# Patient Record
Sex: MALE | Race: WHITE | Employment: OTHER | URBAN - METROPOLITAN AREA
[De-identification: names, ages, dates, MRNs, and addresses within clinical notes are randomized per-mention and may not be internally consistent; named-entity substitution may affect disease eponyms.]

---

## 2019-07-18 ENCOUNTER — OFFICE VISIT (OUTPATIENT)
Dept: ORTHOPEDIC SURGERY | Age: 74
End: 2019-07-18

## 2019-07-18 VITALS — WEIGHT: 168 LBS | HEIGHT: 69 IN | BODY MASS INDEX: 24.88 KG/M2

## 2019-07-18 DIAGNOSIS — M77.11 RIGHT TENNIS ELBOW: Primary | ICD-10-CM

## 2019-07-18 DIAGNOSIS — M25.521 ELBOW PAIN, RIGHT: ICD-10-CM

## 2019-07-18 PROCEDURE — 1123F ACP DISCUSS/DSCN MKR DOCD: CPT | Performed by: ORTHOPAEDIC SURGERY

## 2019-07-18 PROCEDURE — 3017F COLORECTAL CA SCREEN DOC REV: CPT | Performed by: ORTHOPAEDIC SURGERY

## 2019-07-18 PROCEDURE — 1036F TOBACCO NON-USER: CPT | Performed by: ORTHOPAEDIC SURGERY

## 2019-07-18 PROCEDURE — 99203 OFFICE O/P NEW LOW 30 MIN: CPT | Performed by: ORTHOPAEDIC SURGERY

## 2019-07-18 PROCEDURE — 20605 DRAIN/INJ JOINT/BURSA W/O US: CPT | Performed by: ORTHOPAEDIC SURGERY

## 2019-07-18 PROCEDURE — 4040F PNEUMOC VAC/ADMIN/RCVD: CPT | Performed by: ORTHOPAEDIC SURGERY

## 2019-07-18 PROCEDURE — G8420 CALC BMI NORM PARAMETERS: HCPCS | Performed by: ORTHOPAEDIC SURGERY

## 2019-07-18 PROCEDURE — G8427 DOCREV CUR MEDS BY ELIG CLIN: HCPCS | Performed by: ORTHOPAEDIC SURGERY

## 2019-07-18 NOTE — PROGRESS NOTES
Date:  2019    Name:  Gallo Pittman  Address:  [address] [place] [address]  [place] [address]    :  1945      Age:   68 y.o.    SSN:        Medical Record Number:  [id number]    Reason for Visit:    Chief Complaint    Elbow Pain (OP/SP - Right elbow pain)      DOS:2019     HPI: Gallo Pittman is a pleasant 68 y.o. male here today for evalutaion of his right elbow. We previously saw him in 2016 for lateral epicondylitis of the right elbow. This was resolved with a conservative treatment plan of physical therapy, an anti-inflammatory (Mobic) and a corticosteroid injection. He recently began experiencing symptoms again a few weeks ago with no known injury associated. This pain has been gradually getting worse with activities, especially throwing a ball with his grandson. He continues to wear the counterforce elbow brace daily since we have treated him in 2016. Additionally he is seeking Dr Los Cid for arthritis of his right thumb. He presents today with a thumb splint over the thumb. He reports no new or active neurological symptoms . Pain Assessment  Location of Pain: Elbow  Location Modifiers: Right  Severity of Pain: 8  Quality of Pain: Sharp, Aching  Duration of Pain: Persistent  Frequency of Pain: Intermittent  Aggravating Factors: (Gripping exercises)  Limiting Behavior: Yes  Relieving Factors: Rest  Result of Injury: No  Work-Related Injury: No  Are there other pain locations you wish to document?: No  ROS: All systems reviewed and otherwise negative. Pertinent items are noted in HPI.         Past Medical History:   Diagnosis Date    Arthritis     H/O blood clots     Neuropathy     Parkinson disease (HCC)     Seizure ([facility] [address]     Thyroid disease         Past Surgical History:   Procedure Laterality Date    APPENDECTOMY      NOSE SURGERY      WRIST SURGERY         Family History   Problem Relation Age of Onset    Parkinsonism Sister     Diabetes Sister     Heart Disease Brother Social History     Socioeconomic History    Marital status:      Spouse name: None    Number of children: None    Years of education: None    Highest education level: None   Occupational History    None   Social Needs    Financial resource strain: None    Food insecurity:     Worry: None     Inability: None    Transportation needs:     Medical: None     Non-medical: None   Tobacco Use    Smoking status: Never Smoker    Smokeless tobacco: Never Used   Substance and Sexual Activity    Alcohol use:  Yes     Alcohol/week: 0.0 standard drinks    Drug use: None    Sexual activity: None   Lifestyle    Physical activity:     Days per week: None     Minutes per session: None    Stress: None   Relationships    Social connections:     Talks on phone: None     Gets together: None     Attends Je[employer] witness service: None     Active member of club or organization: None     Attends meetings of clubs or organizations: None     Relationship status: None    Intimate partner violence:     Fear of current or ex partner: None     Emotionally abused: None     Physically abused: None     Forced sexual activity: None   Other Topics Concern    None   Social History Narrative    None       Current Outpatient Medications   Medication Sig Dispense Refill    rivaroxaban (XARELTO) 20 MG TABS tablet TAKE 1 TABLET EVERY DAY      rasagiline mesylate (AZILECT) 1 MG TABS Take 1 tablet by mouth daily      BACLOFEN PO Take by mouth      LEVOTHYROXINE SODIUM PO Take by mouth      Mometasone Furoate (NASONEX NA) by Nasal route      Esomeprazole Magnesium (NEXIUM PO) Take by mouth      Misc Natural Products (OSTEO BI-FLEX JOINT SHIELD PO) Take by mouth      phenytoin (DILANTIN) 100 MG ER capsule Take by mouth 2 times daily      meloxicam (MOBIC) 15 MG tablet Take 1 tablet by mouth daily 1 po qd prn pain (Patient not taking: Reported on 7/18/2019) 30 tablet 2    Multiple Vitamins-Minerals (MULTIVITAMIN PO) Take by mouth

## 2019-07-25 ENCOUNTER — EVALUATION (OUTPATIENT)
Dept: PHYSICAL THERAPY | Age: 74
End: 2019-07-25

## 2019-07-25 DIAGNOSIS — M77.11 RIGHT LATERAL EPICONDYLITIS: ICD-10-CM

## 2019-07-25 DIAGNOSIS — M25.521 RIGHT ELBOW PAIN: Primary | ICD-10-CM

## 2019-07-25 DIAGNOSIS — M77.11 LATERAL EPICONDYLITIS OF RIGHT ELBOW: Primary | ICD-10-CM

## 2019-07-25 PROCEDURE — G8539 DOC FUNCT AND CARE PLAN: HCPCS | Performed by: PHYSICAL THERAPIST

## 2019-07-25 PROCEDURE — 1101F PT FALLS ASSESS-DOCD LE1/YR: CPT | Performed by: PHYSICAL THERAPIST

## 2019-07-25 PROCEDURE — 97140 MANUAL THERAPY 1/> REGIONS: CPT | Performed by: PHYSICAL THERAPIST

## 2019-07-25 PROCEDURE — 97110 THERAPEUTIC EXERCISES: CPT | Performed by: PHYSICAL THERAPIST

## 2019-07-25 PROCEDURE — G8730 PAIN DOC POS AND PLAN: HCPCS | Performed by: PHYSICAL THERAPIST

## 2019-07-25 PROCEDURE — G8427 DOCREV CUR MEDS BY ELIG CLIN: HCPCS | Performed by: PHYSICAL THERAPIST

## 2019-07-25 PROCEDURE — L3908 WHO COCK-UP NONMOLDE PRE OTS: HCPCS | Performed by: ORTHOPAEDIC SURGERY

## 2019-07-25 PROCEDURE — 97161 PT EVAL LOW COMPLEX 20 MIN: CPT | Performed by: PHYSICAL THERAPIST

## 2019-07-25 NOTE — PLAN OF CARE
[]profession/work barriers  [x]past PT/medical experience- prior skilled therapy experience   []other:  Justification: See above      Falls Risk Assessment (30 days):   [x] Falls Risk assessed and no intervention required. [] Falls Risk assessed and Patient requires intervention due to being higher risk   TUG score (>12s at risk):     [] Falls education provided, including           Patient BMI:24.81 kg/m²    Pain  [x]  Pain diagram was completed, pain was present and a follow up plan to address the pain is in the plan of care. ()   []  Pain diagram was completed and there was no pain present and therefore no follow up plan to address pain in the plan of care. ()   []  Pain diagram was not completed and the reason for not completing the pain diagram is in the medical chart ()  []  Patient refused to participate   []  Severe mental or physical capacity, patient is in urgent emergent situation and to complete the questionnaire would jeopardize the patient's health status        Functional Questionnaire  [x]  Patient completed the functional outcome questionnaire and deficiencies were addressed in the plan of care. ()   []  Patient completed the outcome questionnaire and there were no functional deficits identified and therefore no plan of care is required. ()   []  Patient did not complete the functional outcome questionnaire and the reason why is documented in the medical chart. ()  []  Patient refused to participate   []  Patient unable to complete the questionnaire   []   A functional outcome assessment has been reported on within the last 30 days        Falls  [x]  The patient has had no falls or 1 fall without injury in the past year. (1101F)   []  There is no documentation of fall in the medical chart (1101F,8P)     [] The patient has had 2 or more falls or one fall with injury in the past year that is documented in the medical chart.  (1100F)  []  A falls risk assessment was completed and documented in the medical chart. (0272M)   []  Falls were addressed in the plan of care. (4407H)   []  A falls risk assessment was not completed and documented in the medical chart (6462W,3I)   []   Falls were not addressed in the plan of care and reason was documented in the plan of care. (3217I 1P)        Medication     [x] A list of current medications (including prescription, over the counter, herbal supplements, vitamin supplements, mineral supplements, dietary supplements) was reviewed with the patient and documented in the medical chart. ()    ASSESSMENT:   Functional Impairments   [x]Noted spinal or UE joint hypomobility   []Noted spinal or UE joint hypermobility   [x]Decreased UE functional ROM   []Decreased UE functional strength   [x]Abnormal reflexes/sensation/myotomal/dermatomal deficits   []Decreased RC/scapular/core strength and neuromuscular control   []other:      Functional Activity Limitations (from functional questionnaire and intake)   [x]Reduced ability to tolerate prolonged functional positions   []Reduced ability or difficulty with changes of positions or transfers between positions   []Reduced ability to maintain good posture and demonstrate good body mechanics with sitting, bending, and lifting   [x] Reduced ability or tolerance with driving and/or computer work   []Reduced ability to sleep   [x]Reduced ability to perform lifting, reaching, carrying tasks   []Reduced ability to tolerate impact through UE   []Reduced ability to reach behind back   [x]Reduced ability to  or hold objects   []Reduced ability to throw or toss an object   []other:    Participation Restrictions   [x]Reduced participation in self care activities   [x]Reduced participation in home management activities   []Reduced participation in work activities   [x]Reduced participation in social activities. []Reduced participation in sport/recreation activities.     Classification:   []Signs/symptoms hour without any pain in R elbow so that he may return to PLOF.          Electronically signed by:  Saurabh Montilla, PT, DPT, OMT-C      [facility] PT license: [id number]  [place] PT license: [id number]

## 2019-07-25 NOTE — FLOWSHEET NOTE
[facility]   Phone: [phone]    Fax: [phone]        Physical Therapy Daily Treatment Note  Date:  2019    Patient Name:  Destinee Adames    :  1945  MRN: [id number]  Medical/Treatment Diagnosis Information:  · Diagnosis: R tennis elbow  ·    Insurance/Certification information:  PT Insurance Information: [facility] [employer] Medicare  Physician Information:  Referring Practitioner: Dr. Art Knight of care signed (Y/N): sent 19    Date of Patient follow up with Physician: 19    Functional Outcome Measure:      Date Applied:  19   UEFI:  58/80  27.5% limitation     PQRS:   Reviewed medication list with patient. No changes since last PT visit.   19    Progress Note: [x]  Yes  []  No  Next due by: Visit #10 or 19    Latex Allergy:  [x]NO      []YES  Preferred Language for Healthcare:   [x]English       []other:    Visit # Insurance Allowable   1 Med nec      Pain level:  0/10 at rest after steroid injection   Prior to steroid injection up to 7/10     SUBJECTIVE:  See eval    OBJECTIVE: See eval  ROM PROM AROM Comments: 19     Left Right Left Right     Elbow flexion             Elbow extension             Pronation             Supination             Wrist flexion     50° 40°     Wrist extension     60° 48°     Wrist radial deviation     28° 28°     Wrist ulnar deviation     15° 25°        Special Tests Left Right Comments: 19   Cozens   Negative        Strength Left Right Comments:   19   Elbow flexion 5/5 5/5     Elbow extension         Pronation         Supination         Wrist flexion 5/5 5/5     Wrist extension 5/5 5/5     Wrist radial deviation 5/5 5/5     Wrist ulnar deviation 5/5 5/5         Left Right Comments: 19   Level 1         Level 2 67.3 lbs 57.7 lbs     Level 3 60 lbs 59 lbs            RESTRICTIONS/PRECAUTIONS: hx of R wrist fracture and surgery, intermittent numbness in all 5 fingers of R hand, hx of R tennis proper functional mobility as indicated by patients Functional Deficits. 4. Patient will return to all functional activities without increased symptoms or restriction. 5. Patient will be able to drive greater than one hour without any pain in R elbow so that he may return to PLOF. Progression Towards Functional goals:  [] Patient is progressing as expected towards functional goals listed. [] Progression is slowed due to complexities listed. [] Progression has been slowed due to co-morbidities.   [x] Plan just implemented, too soon to assess goals progression  [] Other:     ASSESSMENT:  See eval    Treatment/Activity Tolerance:  [x] Patient tolerated treatment well [] Patient limited by fatique  [] Patient limited by pain  [] Patient limited by other medical complications  [] Other:     Prognosis: [x] Good [] Fair  [] Poor    Patient Requires Follow-up: [x] Yes  [] No    PLAN: See eval  [] Continue per plan of care [] Alter current plan (see comments)  [x] Plan of care initiated [] Hold pending MD visit [] Discharge    Electronically signed by: Xuan Solomon DPT, OMT-C      [address] [facility] PT license: [id number]  [place] PT license: [id number]

## 2019-08-01 ENCOUNTER — TREATMENT (OUTPATIENT)
Dept: PHYSICAL THERAPY | Age: 74
End: 2019-08-01
Payer: MEDICARE

## 2019-08-01 DIAGNOSIS — M25.521 RIGHT ELBOW PAIN: Primary | ICD-10-CM

## 2019-08-01 DIAGNOSIS — M77.11 RIGHT LATERAL EPICONDYLITIS: ICD-10-CM

## 2019-08-01 PROCEDURE — G8427 DOCREV CUR MEDS BY ELIG CLIN: HCPCS | Performed by: PHYSICAL THERAPIST

## 2019-08-01 PROCEDURE — 97110 THERAPEUTIC EXERCISES: CPT | Performed by: PHYSICAL THERAPIST

## 2019-08-01 PROCEDURE — 97140 MANUAL THERAPY 1/> REGIONS: CPT | Performed by: PHYSICAL THERAPIST

## 2019-08-01 NOTE — FLOWSHEET NOTE
carrying, lifting, house/yardwork, driving/computer work  [] (48772) Reviewed/Progressed HEP activities related to improving balance, coordination, kinesthetic sense, posture, motor skill, proprioception of scapular, scapulothoracic and UE control with self care, reaching, carrying, lifting, house/yardwork, driving/computer work      Manual Treatments:  PROM / STM / Oscillations-Mobs:  G-I, II, III, IV (PA's, Inf., Post.)  [x] (02656) Provided manual therapy to mobilize soft tissue/joints of cervical/CT, scapular GHJ and UE for the purpose of modulating pain, promoting relaxation,  increasing ROM, reducing/eliminating soft tissue swelling/inflammation/restriction, improving soft tissue extensibility and allowing for proper ROM for normal function with self care, reaching, carrying, lifting, house/yardwork, driving/computer work    Modalities: Ice massage to R elbow+ proximal wrist extensors- 8'     Charges:  Timed Code Treatment Minutes: 35   Total Treatment Minutes: 50     [] EVAL (LOW) 78904 (typically 20 minutes face-to-face)  [] EVAL (MOD) 42438 (typically 30 minutes face-to-face)   [] EVAL (HIGH) 24371 (typically 45 minutes face-to-face)  [] RE-EVAL   [x] OQ(73245) x  1   [] IONTO  [] NMR (47952) x      [] VASO  [x] Manual (08648) x  1    [] Other:  [] TA (49415) x       [] Mech Traction (73249)  [] ES(attended) (70131)      [] ES (un) (10846):     GOALS:  Patient stated goal: Be able to drive pain free     Therapist goals for Patient:   Short Term Goals: To be achieved in: 2 weeks  1. Independent in HEP and progression per patient tolerance, in order to prevent re-injury. 2. Patient will have a decrease in pain to facilitate improvement in movement, function, and ADLs as indicated by Functional Deficits.     Long Term Goals: To be achieved in: 6 weeks  1. Disability index score of 10% or less for the UEFI to assist with reaching prior level of function.    2. Patient will demonstrate increased R wrist

## 2019-08-13 ENCOUNTER — TREATMENT (OUTPATIENT)
Dept: PHYSICAL THERAPY | Age: 74
End: 2019-08-13
Payer: MEDICARE

## 2019-08-13 DIAGNOSIS — M25.521 RIGHT ELBOW PAIN: Primary | ICD-10-CM

## 2019-08-13 DIAGNOSIS — M77.11 RIGHT LATERAL EPICONDYLITIS: ICD-10-CM

## 2019-08-13 PROCEDURE — 97530 THERAPEUTIC ACTIVITIES: CPT | Performed by: PHYSICAL THERAPIST

## 2019-08-13 PROCEDURE — 97140 MANUAL THERAPY 1/> REGIONS: CPT | Performed by: PHYSICAL THERAPIST

## 2019-08-13 PROCEDURE — G8427 DOCREV CUR MEDS BY ELIG CLIN: HCPCS | Performed by: PHYSICAL THERAPIST

## 2019-08-13 PROCEDURE — 97110 THERAPEUTIC EXERCISES: CPT | Performed by: PHYSICAL THERAPIST

## 2019-08-13 NOTE — PLAN OF CARE
scapulothoracic and UE control with self care, reaching, carrying, lifting, house/yardwork, driving/computer work.    [] (56618) Provided verbal/tactile cueing for activities related to improving balance, coordination, kinesthetic sense, posture, motor skill, proprioception  to assist with  scapular, scapulothoracic and UE control with self care, reaching, carrying, lifting, house/yardwork, driving/computer work. Therapeutic Activities:    [] (70848 or 59641) Provided verbal/tactile cueing for activities related to improving balance, coordination, kinesthetic sense, posture, motor skill, proprioception and motor activation to allow for proper function of scapular, scapulothoracic and UE control with self care, carrying, lifting, driving/computer work. Home Exercise Program:    [x] (84478) Reviewed/Progressed HEP activities related to strengthening, flexibility, endurance, ROM of scapular, scapulothoracic and UE control with self care, reaching, carrying, lifting, house/yardwork, driving/computer work  [] (11464) Reviewed/Progressed HEP activities related to improving balance, coordination, kinesthetic sense, posture, motor skill, proprioception of scapular, scapulothoracic and UE control with self care, reaching, carrying, lifting, house/yardwork, driving/computer work      Manual Treatments:  PROM / STM / Oscillations-Mobs:  G-I, II, III, IV (PA's, Inf., Post.)  [x] (19638) Provided manual therapy to mobilize soft tissue/joints of cervical/CT, scapular GHJ and UE for the purpose of modulating pain, promoting relaxation,  increasing ROM, reducing/eliminating soft tissue swelling/inflammation/restriction, improving soft tissue extensibility and allowing for proper ROM for normal function with self care, reaching, carrying, lifting, house/yardwork, driving/computer work    Modalities:   Ice massage to R elbow+ proximal wrist extensors- 8'     Charges:  Timed Code Treatment Minutes: 40   Total Treatment Minutes: 55     [] EVAL (LOW) 13967 (typically 20 minutes face-to-face)  [] EVAL (MOD) 07255 (typically 30 minutes face-to-face)   [] EVAL (HIGH) 06868 (typically 45 minutes face-to-face)  [] RE-EVAL   [x] MB(97505) x  1   [] IONTO  [] NMR (88959) x      [] VASO  [x] Manual (94551) x  1    [] Other:  [x] TA (71007) x  1    [] Mech Traction (21702)  [] ES(attended) (05737)      [] ES (un) (09649):     GOALS:  Patient stated goal: Be able to drive pain free     Therapist goals for Patient:   Short Term Goals: To be achieved in: 2 weeks  1. Independent in HEP and progression per patient tolerance, in order to prevent re-injury. MET  2. Patient will have a decrease in pain to facilitate improvement in movement, function, and ADLs as indicated by Functional Deficits. MET     Long Term Goals: To be achieved in: 6 weeks  1. Disability index score of 10% or less for the UEFI to assist with reaching prior level of function. MET  2. Patient will demonstrate increased R wrist flexion and extension AROM to 50° and 60° to allow for proper joint functioning as indicated by patients Functional Deficits. PARTIALLY MET  3. Patient will demonstrate an increase in R  Strength to >60 lbs of force at  2 and 3 to allow for proper functional mobility as indicated by patients Functional Deficits. MET  4. Patient will return to all functional activities without increased symptoms or restriction. MET  5. Patient will be able to drive greater than one hour without any pain in R elbow so that he may return to PLOF. MET    Progression Towards Functional goals:  [x] Patient is progressing as expected towards functional goals listed. [] Progression is slowed due to complexities listed. [] Progression has been slowed due to co-morbidities. [] Plan just implemented, too soon to assess goals progression  [] Other:     ASSESSMENT:  Patient demonstrates excellent progress toward all LTGs previously set by  PT.  He continues to demonstrate

## 2019-08-29 ENCOUNTER — OFFICE VISIT (OUTPATIENT)
Dept: ORTHOPEDIC SURGERY | Age: 74
End: 2019-08-29
Payer: MEDICARE

## 2019-08-29 VITALS
HEIGHT: 69 IN | WEIGHT: 167.99 LBS | BODY MASS INDEX: 24.88 KG/M2 | DIASTOLIC BLOOD PRESSURE: 56 MMHG | SYSTOLIC BLOOD PRESSURE: 128 MMHG | HEART RATE: 63 BPM

## 2019-08-29 DIAGNOSIS — M77.11 LATERAL EPICONDYLITIS OF RIGHT ELBOW: Primary | ICD-10-CM

## 2019-08-29 DIAGNOSIS — M77.11 RIGHT TENNIS ELBOW: ICD-10-CM

## 2019-08-29 DIAGNOSIS — M25.521 ELBOW PAIN, RIGHT: ICD-10-CM

## 2019-08-29 PROCEDURE — 4040F PNEUMOC VAC/ADMIN/RCVD: CPT | Performed by: ORTHOPAEDIC SURGERY

## 2019-08-29 PROCEDURE — G8427 DOCREV CUR MEDS BY ELIG CLIN: HCPCS | Performed by: ORTHOPAEDIC SURGERY

## 2019-08-29 PROCEDURE — G8420 CALC BMI NORM PARAMETERS: HCPCS | Performed by: ORTHOPAEDIC SURGERY

## 2019-08-29 PROCEDURE — 1123F ACP DISCUSS/DSCN MKR DOCD: CPT | Performed by: ORTHOPAEDIC SURGERY

## 2019-08-29 PROCEDURE — 99213 OFFICE O/P EST LOW 20 MIN: CPT | Performed by: ORTHOPAEDIC SURGERY

## 2019-08-29 PROCEDURE — 3017F COLORECTAL CA SCREEN DOC REV: CPT | Performed by: ORTHOPAEDIC SURGERY

## 2019-08-29 PROCEDURE — 4004F PT TOBACCO SCREEN RCVD TLK: CPT | Performed by: ORTHOPAEDIC SURGERY

## 2019-08-29 NOTE — PROGRESS NOTES
flexors, normal strength of the biceps and triceps     Neurovascular: Palpable radial pulse, normal sensation in the median, ulnar, radial nerve distributions     Special Tests:  Negative piano key test.       Radiology:     No new XR obtained at this time. Assessment :  Mr. Gallo Pittman is a pleasant, 68 y.o. patient with resolving symptoms related to right tennis elbow. Impression:  Encounter Diagnoses   Name Primary?  Lateral epicondylitis of right elbow Yes    Elbow pain, right     Right tennis elbow        Office Procedures:  No orders of the defined types were placed in this encounter. Treatment Plan:  Gallo Pittman has responded very well to conservative treatment. We recommend he continue to wear his counter force elbow brace with activities. If his symptoms begin to return he can resume wearing the cockup wrist splint at night. We would like him to continue his home exercises for preventative measures. We will see Loretta Fly back as needed. All questions were answered to patient's satisfaction and He was encouraged to call with any further questions or concerns. Gallo Pittman is in agreement with this plan. 8/29/2019  8:51 AM      Ricky Nguyen ATC  Orthopaedic Sports Medicine     During this examination, I, Ricky Nguyen ATC, functioned as a scribe for Dr. Ashleigh Horowitz. The history taking and physical examination were performed by Dr. Liss Maya. All counsleing during the appointment was performed between the patient and Dr. Liss Maya. 8/29/19   ____________  I, Dr. Ashleigh Horowitz, personally performed the services described in this documentation as described by Ricky Nguyen ATC in my presence, and it is both accurate and complete. Phoebe Maya MD, PhD  8/29/2019

## 2019-09-30 ENCOUNTER — OFFICE VISIT (OUTPATIENT)
Dept: ORTHOPEDIC SURGERY | Age: 74
End: 2019-09-30
Payer: MEDICARE

## 2019-09-30 VITALS
WEIGHT: 170 LBS | HEART RATE: 60 BPM | DIASTOLIC BLOOD PRESSURE: 59 MMHG | BODY MASS INDEX: 25.18 KG/M2 | SYSTOLIC BLOOD PRESSURE: 126 MMHG | HEIGHT: 69 IN

## 2019-09-30 DIAGNOSIS — M25.562 LEFT KNEE PAIN, UNSPECIFIED CHRONICITY: Primary | ICD-10-CM

## 2019-09-30 PROCEDURE — G8427 DOCREV CUR MEDS BY ELIG CLIN: HCPCS | Performed by: PHYSICIAN ASSISTANT

## 2019-09-30 PROCEDURE — G8419 CALC BMI OUT NRM PARAM NOF/U: HCPCS | Performed by: PHYSICIAN ASSISTANT

## 2019-09-30 PROCEDURE — 99214 OFFICE O/P EST MOD 30 MIN: CPT | Performed by: PHYSICIAN ASSISTANT

## 2019-09-30 PROCEDURE — 3017F COLORECTAL CA SCREEN DOC REV: CPT | Performed by: PHYSICIAN ASSISTANT

## 2019-09-30 PROCEDURE — 4040F PNEUMOC VAC/ADMIN/RCVD: CPT | Performed by: PHYSICIAN ASSISTANT

## 2019-09-30 PROCEDURE — 1123F ACP DISCUSS/DSCN MKR DOCD: CPT | Performed by: PHYSICIAN ASSISTANT

## 2019-09-30 PROCEDURE — 4004F PT TOBACCO SCREEN RCVD TLK: CPT | Performed by: PHYSICIAN ASSISTANT

## 2019-09-30 NOTE — PROGRESS NOTES
swelling present. Ligamentous stability: No cruciate or collateral ligament instability. Neurologic and vascular: Skin is warm and well-perfused. Sensation is intact to light-touch. Special tests: Negative Julita sign. Diagnostics:  Radiology:       Pertinent imaging reviewed, images only - no report available. Radiographs were obtained and reviewed in the office; 4 views: bilateral PA, bilateral Toshia Calico, bilateral Merchants AND LEFT lateral    Impression: no acute bony abnormalities      Assessment: 68yo male with concern for left knee medial meniscus tear. Plan: Pertinent imaging was reviewed. Will get an MRI of his right knee to ruleout a medial meniscus tear. Followup for MRI results or sooner if needed. Lucian Stark is in agreement with this plan. All questions were answered to patient's satisfaction and was encouraged to call with any further questions. Orders Placed This Encounter   Procedures    XR KNEE LEFT (MIN 4 VIEWS)     Standing Status:   Future     Number of Occurrences:   1     Standing Expiration Date:   9/30/2020    XR KNEE RIGHT (3 VIEWS)     Standing Status:   Future     Number of Occurrences:   1     Standing Expiration Date:   9/30/2020    MRI KNEE LEFT WO CONTRAST     Standing Status:   Future     Standing Expiration Date:   9/30/2020     Order Specific Question:   Reason for exam:     Answer:   MRI LEFT KNEE W/3D  R/O MENISCUS TEAR     Order Specific Question:   Reason for exam:     Answer:   Elías Edgar, [facility]   Date:    9/30/2019      This dictation was performed with a verbal recognition program (DRAGON) and it was checked for errors. It is possible that there are still dictated errors within this office note. If so, please bring any errors to my attention for an addendum. All efforts were made to ensure that this office note is accurate.

## 2019-10-07 ENCOUNTER — OFFICE VISIT (OUTPATIENT)
Dept: ORTHOPEDIC SURGERY | Age: 74
End: 2019-10-07
Payer: MEDICARE

## 2019-10-07 VITALS
HEART RATE: 72 BPM | HEIGHT: 69 IN | SYSTOLIC BLOOD PRESSURE: 133 MMHG | WEIGHT: 169.97 LBS | BODY MASS INDEX: 25.18 KG/M2 | DIASTOLIC BLOOD PRESSURE: 62 MMHG

## 2019-10-07 DIAGNOSIS — S83.282A TEAR OF LATERAL MENISCUS OF LEFT KNEE, CURRENT, UNSPECIFIED TEAR TYPE, INITIAL ENCOUNTER: Primary | ICD-10-CM

## 2019-10-07 PROCEDURE — 1123F ACP DISCUSS/DSCN MKR DOCD: CPT | Performed by: PHYSICIAN ASSISTANT

## 2019-10-07 PROCEDURE — G8419 CALC BMI OUT NRM PARAM NOF/U: HCPCS | Performed by: PHYSICIAN ASSISTANT

## 2019-10-07 PROCEDURE — G8484 FLU IMMUNIZE NO ADMIN: HCPCS | Performed by: PHYSICIAN ASSISTANT

## 2019-10-07 PROCEDURE — G8427 DOCREV CUR MEDS BY ELIG CLIN: HCPCS | Performed by: PHYSICIAN ASSISTANT

## 2019-10-07 PROCEDURE — 99214 OFFICE O/P EST MOD 30 MIN: CPT | Performed by: PHYSICIAN ASSISTANT

## 2019-10-07 PROCEDURE — 3017F COLORECTAL CA SCREEN DOC REV: CPT | Performed by: PHYSICIAN ASSISTANT

## 2019-10-07 PROCEDURE — 4004F PT TOBACCO SCREEN RCVD TLK: CPT | Performed by: PHYSICIAN ASSISTANT

## 2019-10-07 PROCEDURE — 4040F PNEUMOC VAC/ADMIN/RCVD: CPT | Performed by: PHYSICIAN ASSISTANT

## 2019-10-07 ASSESSMENT — ENCOUNTER SYMPTOMS: BACK PAIN: 1

## 2019-10-10 ENCOUNTER — EVALUATION (OUTPATIENT)
Dept: PHYSICAL THERAPY | Age: 74
End: 2019-10-10
Payer: MEDICARE

## 2019-10-10 DIAGNOSIS — M25.462 SWELLING OF LEFT KNEE JOINT: ICD-10-CM

## 2019-10-10 DIAGNOSIS — M25.562 LEFT KNEE PAIN, UNSPECIFIED CHRONICITY: ICD-10-CM

## 2019-10-10 DIAGNOSIS — S83.282D TEAR OF LATERAL MENISCUS OF LEFT KNEE, CURRENT, UNSPECIFIED TEAR TYPE, SUBSEQUENT ENCOUNTER: Primary | ICD-10-CM

## 2019-10-10 PROCEDURE — 97161 PT EVAL LOW COMPLEX 20 MIN: CPT | Performed by: PHYSICAL THERAPIST

## 2019-10-10 PROCEDURE — G8427 DOCREV CUR MEDS BY ELIG CLIN: HCPCS | Performed by: PHYSICAL THERAPIST

## 2019-10-10 PROCEDURE — 97112 NEUROMUSCULAR REEDUCATION: CPT | Performed by: PHYSICAL THERAPIST

## 2019-10-10 PROCEDURE — G8730 PAIN DOC POS AND PLAN: HCPCS | Performed by: PHYSICAL THERAPIST

## 2019-10-10 PROCEDURE — 97110 THERAPEUTIC EXERCISES: CPT | Performed by: PHYSICAL THERAPIST

## 2019-10-10 PROCEDURE — G8539 DOC FUNCT AND CARE PLAN: HCPCS | Performed by: PHYSICAL THERAPIST

## 2019-10-10 PROCEDURE — 1100F PTFALLS ASSESS-DOCD GE2>/YR: CPT | Performed by: PHYSICAL THERAPIST

## 2019-10-14 ENCOUNTER — TREATMENT (OUTPATIENT)
Dept: PHYSICAL THERAPY | Age: 74
End: 2019-10-14

## 2019-10-14 ENCOUNTER — OFFICE VISIT (OUTPATIENT)
Dept: ORTHOPEDIC SURGERY | Age: 74
End: 2019-10-14
Payer: MEDICARE

## 2019-10-14 VITALS
HEART RATE: 70 BPM | BODY MASS INDEX: 25.18 KG/M2 | DIASTOLIC BLOOD PRESSURE: 78 MMHG | WEIGHT: 170 LBS | HEIGHT: 69 IN | SYSTOLIC BLOOD PRESSURE: 130 MMHG

## 2019-10-14 DIAGNOSIS — M25.562 LEFT KNEE PAIN, UNSPECIFIED CHRONICITY: ICD-10-CM

## 2019-10-14 DIAGNOSIS — M25.462 SWELLING OF LEFT KNEE JOINT: ICD-10-CM

## 2019-10-14 DIAGNOSIS — M17.12 OSTEOARTHRITIS OF LEFT KNEE, UNSPECIFIED OSTEOARTHRITIS TYPE: Primary | ICD-10-CM

## 2019-10-14 DIAGNOSIS — S83.282D TEAR OF LATERAL MENISCUS OF LEFT KNEE, CURRENT, UNSPECIFIED TEAR TYPE, SUBSEQUENT ENCOUNTER: Primary | ICD-10-CM

## 2019-10-14 PROCEDURE — 4004F PT TOBACCO SCREEN RCVD TLK: CPT | Performed by: ORTHOPAEDIC SURGERY

## 2019-10-14 PROCEDURE — G8419 CALC BMI OUT NRM PARAM NOF/U: HCPCS | Performed by: ORTHOPAEDIC SURGERY

## 2019-10-14 PROCEDURE — G8427 DOCREV CUR MEDS BY ELIG CLIN: HCPCS | Performed by: ORTHOPAEDIC SURGERY

## 2019-10-14 PROCEDURE — 20610 DRAIN/INJ JOINT/BURSA W/O US: CPT | Performed by: ORTHOPAEDIC SURGERY

## 2019-10-14 PROCEDURE — 99213 OFFICE O/P EST LOW 20 MIN: CPT | Performed by: ORTHOPAEDIC SURGERY

## 2019-10-14 PROCEDURE — G8484 FLU IMMUNIZE NO ADMIN: HCPCS | Performed by: ORTHOPAEDIC SURGERY

## 2019-10-14 PROCEDURE — 3017F COLORECTAL CA SCREEN DOC REV: CPT | Performed by: ORTHOPAEDIC SURGERY

## 2019-10-14 PROCEDURE — 1123F ACP DISCUSS/DSCN MKR DOCD: CPT | Performed by: ORTHOPAEDIC SURGERY

## 2019-10-14 PROCEDURE — 4040F PNEUMOC VAC/ADMIN/RCVD: CPT | Performed by: ORTHOPAEDIC SURGERY

## 2019-10-14 RX ORDER — METHYLPREDNISOLONE ACETATE 40 MG/ML
40 INJECTION, SUSPENSION INTRA-ARTICULAR; INTRALESIONAL; INTRAMUSCULAR; SOFT TISSUE ONCE
Status: COMPLETED | OUTPATIENT
Start: 2019-10-14 | End: 2019-10-14

## 2019-10-14 RX ADMIN — METHYLPREDNISOLONE ACETATE 40 MG: 40 INJECTION, SUSPENSION INTRA-ARTICULAR; INTRALESIONAL; INTRAMUSCULAR; SOFT TISSUE at 18:12

## 2019-11-20 ENCOUNTER — TELEPHONE (OUTPATIENT)
Dept: ORTHOPEDIC SURGERY | Age: 74
End: 2019-11-20

## 2019-12-02 ENCOUNTER — OFFICE VISIT (OUTPATIENT)
Dept: ORTHOPEDIC SURGERY | Age: 74
End: 2019-12-02
Payer: MEDICARE

## 2019-12-02 VITALS
DIASTOLIC BLOOD PRESSURE: 78 MMHG | HEART RATE: 70 BPM | SYSTOLIC BLOOD PRESSURE: 130 MMHG | HEIGHT: 69 IN | WEIGHT: 169.97 LBS | BODY MASS INDEX: 25.18 KG/M2

## 2019-12-02 DIAGNOSIS — M17.12 OSTEOARTHRITIS OF LEFT KNEE, UNSPECIFIED OSTEOARTHRITIS TYPE: Primary | ICD-10-CM

## 2019-12-02 PROCEDURE — 3017F COLORECTAL CA SCREEN DOC REV: CPT | Performed by: PHYSICIAN ASSISTANT

## 2019-12-02 PROCEDURE — 1123F ACP DISCUSS/DSCN MKR DOCD: CPT | Performed by: PHYSICIAN ASSISTANT

## 2019-12-02 PROCEDURE — G8484 FLU IMMUNIZE NO ADMIN: HCPCS | Performed by: PHYSICIAN ASSISTANT

## 2019-12-02 PROCEDURE — 99212 OFFICE O/P EST SF 10 MIN: CPT | Performed by: PHYSICIAN ASSISTANT

## 2019-12-02 PROCEDURE — G8427 DOCREV CUR MEDS BY ELIG CLIN: HCPCS | Performed by: PHYSICIAN ASSISTANT

## 2019-12-02 PROCEDURE — 4040F PNEUMOC VAC/ADMIN/RCVD: CPT | Performed by: PHYSICIAN ASSISTANT

## 2019-12-02 PROCEDURE — 4004F PT TOBACCO SCREEN RCVD TLK: CPT | Performed by: PHYSICIAN ASSISTANT

## 2019-12-02 PROCEDURE — G8417 CALC BMI ABV UP PARAM F/U: HCPCS | Performed by: PHYSICIAN ASSISTANT

## 2019-12-02 ASSESSMENT — ENCOUNTER SYMPTOMS: BACK PAIN: 1

## 2019-12-06 ENCOUNTER — OFFICE VISIT (OUTPATIENT)
Dept: ORTHOPEDIC SURGERY | Age: 74
End: 2019-12-06
Payer: MEDICARE

## 2019-12-06 VITALS
BODY MASS INDEX: 25.62 KG/M2 | DIASTOLIC BLOOD PRESSURE: 74 MMHG | HEART RATE: 70 BPM | HEIGHT: 69 IN | WEIGHT: 173 LBS | SYSTOLIC BLOOD PRESSURE: 130 MMHG

## 2019-12-06 DIAGNOSIS — M25.512 LEFT SHOULDER PAIN, UNSPECIFIED CHRONICITY: Primary | ICD-10-CM

## 2019-12-06 PROCEDURE — 3017F COLORECTAL CA SCREEN DOC REV: CPT | Performed by: ORTHOPAEDIC SURGERY

## 2019-12-06 PROCEDURE — G8484 FLU IMMUNIZE NO ADMIN: HCPCS | Performed by: ORTHOPAEDIC SURGERY

## 2019-12-06 PROCEDURE — 4040F PNEUMOC VAC/ADMIN/RCVD: CPT | Performed by: ORTHOPAEDIC SURGERY

## 2019-12-06 PROCEDURE — G8417 CALC BMI ABV UP PARAM F/U: HCPCS | Performed by: ORTHOPAEDIC SURGERY

## 2019-12-06 PROCEDURE — G8427 DOCREV CUR MEDS BY ELIG CLIN: HCPCS | Performed by: ORTHOPAEDIC SURGERY

## 2019-12-06 PROCEDURE — 4004F PT TOBACCO SCREEN RCVD TLK: CPT | Performed by: ORTHOPAEDIC SURGERY

## 2019-12-06 PROCEDURE — 99214 OFFICE O/P EST MOD 30 MIN: CPT | Performed by: ORTHOPAEDIC SURGERY

## 2019-12-06 PROCEDURE — 1123F ACP DISCUSS/DSCN MKR DOCD: CPT | Performed by: ORTHOPAEDIC SURGERY

## 2019-12-10 ENCOUNTER — TELEPHONE (OUTPATIENT)
Dept: ORTHOPEDIC SURGERY | Age: 74
End: 2019-12-10

## 2019-12-10 DIAGNOSIS — M75.52 SUBDELTOID BURSITIS OF LEFT SHOULDER JOINT: ICD-10-CM

## 2019-12-10 DIAGNOSIS — M67.813 TENDINOSIS OF RIGHT SHOULDER: ICD-10-CM

## 2019-12-10 DIAGNOSIS — M25.512 LEFT SHOULDER PAIN, UNSPECIFIED CHRONICITY: Primary | ICD-10-CM

## 2019-12-19 ENCOUNTER — EVALUATION (OUTPATIENT)
Dept: PHYSICAL THERAPY | Age: 74
End: 2019-12-19

## 2019-12-19 DIAGNOSIS — M25.512 LEFT SHOULDER PAIN, UNSPECIFIED CHRONICITY: Primary | ICD-10-CM

## 2019-12-19 DIAGNOSIS — M75.52 SUBDELTOID BURSITIS OF LEFT SHOULDER JOINT: ICD-10-CM

## 2019-12-19 DIAGNOSIS — M25.612 DECREASED ROM OF LEFT SHOULDER: ICD-10-CM

## 2019-12-19 DIAGNOSIS — M67.814 TENDINOSIS OF LEFT SHOULDER: ICD-10-CM

## 2019-12-19 NOTE — FLOWSHEET NOTE
[facility]   Phone: [phone]    Fax: [phone]            Physical Therapy  Cancellation/No-show Note  Patient Name:  Vilma Jerry  :  1945   Date:  2019  Cancelled visits to date: 1  No-shows to date: 0    For today's appointment patient:  [x]  Cancelled  []  Rescheduled appointment  []  No-show     Reason given by patient:  []  Patient ill  []  Conflicting appointment  []  No transportation    []  Conflict with work  []  No reason given  [x]  Other:  Patient reports he has a conflict with today's appointment, and apologized for canceling last minute. He notes that he will call back to reschedule.     Comments:      Electronically signed by:  Katiana Tuttle, PT, DPT, OMT-C       [place] PT license: [id number]  [place] PT license: [id number]

## 2020-01-02 ENCOUNTER — EVALUATION (OUTPATIENT)
Dept: PHYSICAL THERAPY | Age: 75
End: 2020-01-02
Payer: MEDICARE

## 2020-01-02 PROCEDURE — 97112 NEUROMUSCULAR REEDUCATION: CPT | Performed by: PHYSICAL THERAPIST

## 2020-01-02 PROCEDURE — 97161 PT EVAL LOW COMPLEX 20 MIN: CPT | Performed by: PHYSICAL THERAPIST

## 2020-01-02 PROCEDURE — 97110 THERAPEUTIC EXERCISES: CPT | Performed by: PHYSICAL THERAPIST

## 2020-01-02 NOTE — PLAN OF CARE
Narciso Caraballo [facility]   Phone: [phone]    Fax: [phone]     Physical Therapy Certification    Dear Referring Practitioner: Dr. Elisa Awan,    We had the pleasure of evaluating the following patient for physical therapy services at [address] [place] [address]. A summary of our findings can be found in the initial assessment below. This includes our plan of care. If you have any questions or concerns regarding these findings, please do not hesitate to contact me at the office phone number checked above. Thank you for the referral.       Physician Signature:_______________________________Date:__________________  By signing above (or electronic signature), therapists plan is approved by physician      Patient: Renetta Bullock   : 1945   MRN: <[id number]>  Referring Physician: Referring Practitioner: Dr. Elisa Awan      Evaluation Date: 2020      Medical Diagnosis Information:  Diagnosis: L shoulder subdeltoid bursitis and L shoulder tendinitis                                             Insurance information: PT Insurance Information: [employer] medicare    Precautions/ Contra-indications: L RTC tendinitis and bursitis  Latex Allergy:  [x]NO      []YES  Preferred Language for Healthcare:   [x]English       []other:      SUBJECTIVE: Patient stated complaint: Patient reports that L shoulder has been bothering him since the summer on . He notes that he had reached out to his side to  a can of beer, and when he was bringing his arm back across his body and his arm dropped. He notes that pain has been improving overall, but still has a small catch when abducting arm, but once he is past that point he has no pain.     Relevant Medical History:Hx of seizures, Hx of parkinson's disease, L medial meniscus tear  Functional Disability Index:  Quick DASH: 2.72% limitation    Pain Scale: 0/10 at rest, 1/10 with abduction  Easing factors: rest  Provocative factors: []?Congenital spine pathologies   [x]? Prior surgical intervention  [x]? Osteoporosis (M81.8)  []? Osteopenia (M85.8)   Endocrine conditions   []? Hypothyroid (E03.9)  []? Hyperthyroid Gastrointestinal conditions   []? Constipation (F60.58)    Metabolic conditions   []? Morbid obesity (E66.01)  []? Diabetes type 1(E10.65) or 2 (E11.65)   [x]? Neuropathy (G60.9)      Pulmonary conditions   []? Asthma (J45)  []? Coughing   []? COPD (J44.9)    Psychological Disorders  []? Anxiety (F41.9)  []? Depression (F32.9)   []? Other:    [x]? Other: hx of seizures, Parkinson's Disease, hx surgery and fracture to R wrist             Barriers to/and or personal factors that will affect rehab potential:              []Age  []Sex              [x]Motivation/Lack of Motivation: Patient demonstrates high motivation                        []Co-Morbidities              []Cognitive Function, education/learning barriers              []Environmental, home barriers              []profession/work barriers  [x]past PT/medical experience: Patient has had prior skilled PT experience and demonstrates good understanding of POC  []other:  Justification: see above     Falls Risk Assessment (30 days):   [x] Falls Risk assessed and no intervention required.   [] Falls Risk assessed and Patient requires intervention due to being higher risk   TUG score (>12s at risk):     [] Falls education provided, including        ASSESSMENT:   Functional Impairments   []Noted spinal or UE joint hypomobility   []Noted spinal or UE joint hypermobility   [x]Decreased UE functional ROM   [x]Decreased UE functional strength   []Abnormal reflexes/sensation/myotomal/dermatomal deficits   [x]Decreased RC/scapular/core strength and neuromuscular control   []other:      Functional Activity Limitations (from functional questionnaire and intake)   [x]Reduced ability to tolerate prolonged functional positions   [x]Reduced ability or difficulty with changes of positions or transfers between positions   []Reduced ability to maintain good posture and demonstrate good body mechanics with sitting, bending, and lifting   [x] Reduced ability or tolerance with driving and/or computer work   [x]Reduced ability to sleep   [x]Reduced ability to perform lifting, reaching, carrying tasks   [x]Reduced ability to tolerate impact through UE   [x]Reduced ability to reach behind back   []Reduced ability to  or hold objects   []Reduced ability to throw or toss an object   []other:    Participation Restrictions   [x]Reduced participation in self care activities   [x]Reduced participation in home management activities   [x]Reduced participation in work activities   [x]Reduced participation in social activities. []Reduced participation in sport/recreation activities. Classification:   []Signs/symptoms consistent with post-surgical status including decreased ROM, strength and function.   []Signs/symptoms consistent with joint sprain/strain   [x]Signs/symptoms consistent with shoulder impingement   [x]Signs/symptoms consistent with shoulder/elbow/wrist tendinopathy   []Signs/symptoms consistent with Rotator cuff tear   []Signs/symptoms consistent with labral tear   []Signs/symptoms consistent with postural dysfunction    []Signs/symptoms consistent with Glenohumeral IR Deficit - <45 degrees   []Signs/symptoms consistent with facet dysfunction of cervical/thoracic spine    []Signs/symptoms consistent with pathology which may benefit from Dry needling     []other:     Prognosis/Rehab Potential:      []Excellent   [x]Good    []Fair   []Poor    Tolerance of evaluation/treatment:    []Excellent   [x]Good    []Fair   []Poor    Physical Therapy Evaluation Complexity Justification  [x] A history of present problem with:  [] no personal factors and/or comorbidities that impact the plan of care;  [x]1-2 personal factors and/or comorbidities that impact the plan of care  []3 personal factors and/or comorbidities that impact the plan of care  [x] An examination of body systems using standardized tests and measures addressing any of the following: body structures and functions (impairments), activity limitations, and/or participation restrictions;:  [] a total of 1-2 or more elements   [x] a total of 3 or more elements   [] a total of 4 or more elements   [x] A clinical presentation with:  [x] stable and/or uncomplicated characteristics   [] evolving clinical presentation with changing characteristics  [] unstable and unpredictable characteristics;   [x] Clinical decision making of [x] low, [] moderate, [] high complexity using standardized patient assessment instrument and/or measurable assessment of functional outcome. [x] EVAL (LOW) 43563 (typically 20 minutes face-to-face)  [] EVAL (MOD) 20595 (typically 30 minutes face-to-face)  [] EVAL (HIGH) 03088 (typically 45 minutes face-to-face)  [] RE-EVAL     PLAN:  Frequency/Duration:  1-2 days per week for 4-6 Weeks:  INTERVENTIONS:  [x] Therapeutic exercise including: strength training, ROM, for Upper extremity and core   [x]  NMR activation and proprioception for UE, scap and Core   [x] Manual therapy as indicated for shoulder, scapula and spine to include: Dry Needling/IASTM, STM, PROM, Gr I-IV mobilizations, manipulation. [x] Modalities as needed that may include: thermal agents, E-stim, Biofeedback, US, iontophoresis as indicated  [x] Patient education on joint protection, postural re-education, activity modification, progression of HEP. HEP instruction: Patient returned proper demonstration of HEP. Issued patient written program clearly stating sets/reps/sessions per day. Written program was scanned into media section of medical record. (see scanned forms)    GOALS:  Patient stated goal: Raise arm to side without pain  [] Progressing: [] Met: [] Not Met: [] Adjusted    Therapist goals for Patient:   Short Term Goals: To be achieved in: 2 weeks  1.  Independent in HEP and progression per patient tolerance, in order to prevent re-injury. [] Progressing: [] Met: [] Not Met: [] Adjusted  2. Patient will have a decrease in pain to facilitate improvement in movement, function, and ADLs as indicated by Functional Deficits. [] Progressing: [] Met: [] Not Met: [] Adjusted    Long Term Goals: To be achieved in: 4 weeks  1. Disability index score of 0% for the DASH to assist with reaching prior level of function. [] Progressing: [] Met: [] Not Met: [] Adjusted  2. Patient will demonstrate increased L shoulder abduction AROM to >150° to allow for proper joint functioning as indicated by patients Functional Deficits. [] Progressing: [] Met: [] Not Met: [] Adjusted  3. Patient will demonstrate an increase in L shoulder flexion Strength by a half grade or greater to allow for proper functional mobility as indicated by patients Functional Deficits. [] Progressing: [] Met: [] Not Met: [] Adjusted  4. Patient will return to all functional activities without increased symptoms or restriction. [] Progressing: [] Met: [] Not Met: [] Adjusted  5. Patient will be able to reach [facility] in abduction with L UE without pain so that he may return to PLOF for all ADLs.   [] Progressing: [] Met: [] Not Met: [] Adjusted     Electronically signed by:  Fabian Rincon PT, DPT, OMT-C      [place] PT license: [id number]  [facility] PT license: [id number]

## 2020-01-02 NOTE — FLOWSHEET NOTE
Narciso Helm [facility]   Phone: [phone]    Fax: [phone]      Physical Therapy Treatment Note/ Progress Report:           Date:  2020    Patient Name:  Tete Harding    :  1945  MRN: <[id number]>  Restrictions/Precautions:    Medical/Treatment Diagnosis Information:  · Diagnosis: L shoulder subdeltoid bursitis and L shoulder tendinitis  ·    Insurance/Certification information:  PT Insurance Information: [employer] medicare  Physician Information:  Referring Practitioner: Dr. Patel Joyce  Has the plan of care been signed (Y/N):        []  Yes  [x]  No     Date of Patient follow up with Physician: 20      Is this a Progress Report:     [x]  Yes  []  No        If Yes:  Date Range for reporting period:  Beginnin20  Endin20    Progress report will be due (10 Rx or 30 days whichever is less): 3/9/70       Recertification will be due (POC Duration  / 90 days whichever is less): 20        Visit # Insurance Allowable Auth Required   1 TBD []  Yes [x]  No        Functional Scale: Quick DASH: 2.72% limitation  Date assessed:  20      Latex Allergy:  [x]NO      []YES  Preferred Language for Healthcare:   [x]English       []other:      Pain level:  0/10 at rest, 1/10 with abduction     SUBJECTIVE:  See eval    OBJECTIVE: See eval     ROM PROM AROM  Comment:  20     L R L R     Flexion     160° 160°     Abduction     115°* 150°     ER     T3 C7     IR     T7 T9     Other             Other                    Strength L R Comment:  20   Flexion 4/5* 5/5     Abduction 4+/5 4+/5     ER 4+/5 4+/5     IR 5/5 5/5           Special Tests Results/Comment:  20   Alanis-Malcolm Negative B   Neers Negative B   OBriens Positive on L   Other: empty can Positive on L          RESTRICTIONS/PRECAUTIONS: L RTC tendinitis and bursitis;  Hx of seizures and PD    Exercises/Interventions:       Therapeutic Ex (18596) Sets/sec Reps Notes/CUES   AD UE BIKE STRETCHING/ROM      Pulleys      Table Slides      Wand      UE Emerson      Pendulum      Doorway      Wall Slides 10\" x10 abduction   CBA      TP BB      Sleeper       Biceps stretch 10\" x10                ISOMETRICS      Gripping      Retraction 5\" x20    Abduction 10\" x10    Flexion      Internal Rotation      External Rotation            STRENGTHENING-PREs      Flexion      Abduction      Internal Rotation      External Rotation      Shrugs      Biceps      Triceps      Retraction      Extension      Horizontal Abduction in ER      Serratus                        THERABANDS/CABLE COLUMN      Rows Purple, 2 x10    Lats      Extension Purple, 2 x10    Internal Rotation Tan, 2 x10    External Rotation Alfredo, 2 x10    Biceps      Triceps      PNF                                    Manual Intervention (90549)      Scar Massage      STM      Hawkgrips      GH joint mobilizations      Foamroll                  NMR re-education (87857)   CUES NEEDED   Plyoback      Therabar oscillations      Body Blade       Rhythmic Stabilization      Ball on the wall                              Therapeutic Activity (12735)      Core training      Swiss ball activities      Education                              Therapeutic Exercise and NMR EXR  [x] (41157) Provided verbal/tactile cueing for activities related to strengthening, flexibility, endurance, ROM  for improvements in scapular, scapulothoracic and UE control with self care, reaching, carrying, lifting, house/yardwork, driving/computer work. [x] (86591) Provided verbal/tactile cueing for activities related to improving balance, coordination, kinesthetic sense, posture, motor skill, proprioception  to assist with  scapular, scapulothoracic and UE control with self care, reaching, carrying, lifting, house/yardwork, driving/computer work.     Therapeutic Activities:    [] (95240 or ) Provided verbal/tactile cueing for activities related to improving balance, coordination, kinesthetic sense, posture, motor skill, proprioception and motor activation to allow for proper function of scapular, scapulothoracic and UE control with self care, carrying, lifting, driving/computer work. Home Exercise Program:    [x] (79219) Reviewed/Progressed HEP activities related to strengthening, flexibility, endurance, ROM of scapular, scapulothoracic and UE control with self care, reaching, carrying, lifting, house/yardwork, driving/computer work  [] (64255) Reviewed/Progressed HEP activities related to improving balance, coordination, kinesthetic sense, posture, motor skill, proprioception of scapular, scapulothoracic and UE control with self care, reaching, carrying, lifting, house/yardwork, driving/computer work      Manual Treatments:  PROM / STM / Oscillations-Mobs:  G-I, II, III, IV (PA's, Inf., Post.)  [] (06546) Provided manual therapy to mobilize soft tissue/joints of cervical/CT, scapular GHJ and UE for the purpose of modulating pain, promoting relaxation,  increasing ROM, reducing/eliminating soft tissue swelling/inflammation/restriction, improving soft tissue extensibility and allowing for proper ROM for normal function with self care, reaching, carrying, lifting, house/yardwork, driving/computer work    Modalities:     [] GAME READY (VASO)- for significant edema, swelling, pain control.     Charges:  Timed Code Treatment Minutes: 30   Total Treatment Minutes: 45      [x] EVAL (LOW) 72490 (typically 20 minutes face-to-face)  [] EVAL (MOD) 56398 (typically 30 minutes face-to-face)  [] EVAL (HIGH) 37997 (typically 45 minutes face-to-face)  [] RE-EVAL     [x] NA(93283) x  1   [] IONTO  [x] NMR (38293) x 1    [] VASO  [] Manual (11114) x     [] Other:  [] TA x      [] Mech Traction (35590)  [] ES(attended) (10426)      [] ES (un) (15034):         ASSESSMENT:  See eval      GOALS:   Patient stated goal: Raise arm to side without pain  [] Progressing: [] Met: [] Not Met: [] Adjusted    Therapist goals for Patient:   Short Term Goals: To be achieved in: 2 weeks  1. Independent in HEP and progression per patient tolerance, in order to prevent re-injury. [] Progressing: [] Met: [] Not Met: [] Adjusted  2. Patient will have a decrease in pain to facilitate improvement in movement, function, and ADLs as indicated by Functional Deficits. [] Progressing: [] Met: [] Not Met: [] Adjusted    Long Term Goals: To be achieved in: 4 weeks  1. Disability index score of 0% for the DASH to assist with reaching prior level of function. [] Progressing: [] Met: [] Not Met: [] Adjusted  2. Patient will demonstrate increased L shoulder abduction AROM to >150° to allow for proper joint functioning as indicated by patients Functional Deficits. [] Progressing: [] Met: [] Not Met: [] Adjusted  3. Patient will demonstrate an increase in L shoulder flexion Strength by a half grade or greater to allow for proper functional mobility as indicated by patients Functional Deficits. [] Progressing: [] Met: [] Not Met: [] Adjusted  4. Patient will return to all functional activities without increased symptoms or restriction. [] Progressing: [] Met: [] Not Met: [] Adjusted  5. Patient will be able to reach [place] in abduction with L UE without pain so that he may return to [facility] for all ADLs. [] Progressing: [] Met: [] Not Met: [] Adjusted    Overall Progression Towards Functional goals/ Treatment Progress Update:  [] Patient is progressing as expected towards functional goals listed. [] Progression is slowed due to complexities/Impairments listed. [] Progression has been slowed due to co-morbidities.   [x] Plan just implemented, too soon to assess goals progression <30days   [] Goals require adjustment due to lack of progress  [] Patient is not progressing as expected and requires additional follow up with physician  [] Other    Prognosis for POC: [x] Good [] Fair  [] Poor      Patient requires continued skilled intervention: [x]

## 2020-01-09 ENCOUNTER — TREATMENT (OUTPATIENT)
Dept: PHYSICAL THERAPY | Age: 75
End: 2020-01-09
Payer: MEDICARE

## 2020-01-09 PROCEDURE — 97530 THERAPEUTIC ACTIVITIES: CPT | Performed by: PHYSICAL THERAPIST

## 2020-01-09 PROCEDURE — G8427 DOCREV CUR MEDS BY ELIG CLIN: HCPCS | Performed by: PHYSICAL THERAPIST

## 2020-01-09 PROCEDURE — 97112 NEUROMUSCULAR REEDUCATION: CPT | Performed by: PHYSICAL THERAPIST

## 2020-01-09 PROCEDURE — 97110 THERAPEUTIC EXERCISES: CPT | Performed by: PHYSICAL THERAPIST

## 2020-01-09 NOTE — FLOWSHEET NOTE
proprioception  to assist with  scapular, scapulothoracic and UE control with self care, reaching, carrying, lifting, house/yardwork, driving/computer work. Therapeutic Activities:    [] (12828 or 63361) Provided verbal/tactile cueing for activities related to improving balance, coordination, kinesthetic sense, posture, motor skill, proprioception and motor activation to allow for proper function of scapular, scapulothoracic and UE control with self care, carrying, lifting, driving/computer work. Home Exercise Program:    [x] (09274) Reviewed/Progressed HEP activities related to strengthening, flexibility, endurance, ROM of scapular, scapulothoracic and UE control with self care, reaching, carrying, lifting, house/yardwork, driving/computer work  [] (28338) Reviewed/Progressed HEP activities related to improving balance, coordination, kinesthetic sense, posture, motor skill, proprioception of scapular, scapulothoracic and UE control with self care, reaching, carrying, lifting, house/yardwork, driving/computer work      Manual Treatments:  PROM / STM / Oscillations-Mobs:  G-I, II, III, IV (PA's, Inf., Post.)  [] (75466) Provided manual therapy to mobilize soft tissue/joints of cervical/CT, scapular GHJ and UE for the purpose of modulating pain, promoting relaxation,  increasing ROM, reducing/eliminating soft tissue swelling/inflammation/restriction, improving soft tissue extensibility and allowing for proper ROM for normal function with self care, reaching, carrying, lifting, house/yardwork, driving/computer work    Modalities:     [] GAME READY (VASO)- for significant edema, swelling, pain control.     Charges:  Timed Code Treatment Minutes: 40   Total Treatment Minutes: 50      [] EVAL (LOW) 67168 (typically 20 minutes face-to-face)  [] EVAL (MOD) 47310 (typically 30 minutes face-to-face)  [] EVAL (HIGH) 55022 (typically 45 minutes face-to-face)  [] RE-EVAL     [x] SYED(75229) x  1   [] IONTO  [x] NMR (34835) x 1    [] VASO  [] Manual (70222) x     [] Other:  [x] TA x 1      [] Mech Traction (37148)  [] ES(attended) (83618)      [] ES (un) (54202):         ASSESSMENT:  Patient demonstrates moderate fatigue with introduction of ER and IR isometrics and ball on wall activity today. He demonstrates good activity tolerance and carry over from last session. PT provided patient with updated HEP, in which he demonstrates independence. PT instructed patient to follow up with PT following MD appointment. GOALS:   Patient stated goal: Raise arm to side without pain  [] Progressing: [] Met: [] Not Met: [] Adjusted    Therapist goals for Patient:   Short Term Goals: To be achieved in: 2 weeks  1. Independent in HEP and progression per patient tolerance, in order to prevent re-injury. [] Progressing: [] Met: [] Not Met: [] Adjusted  2. Patient will have a decrease in pain to facilitate improvement in movement, function, and ADLs as indicated by Functional Deficits. [] Progressing: [] Met: [] Not Met: [] Adjusted    Long Term Goals: To be achieved in: 4 weeks  1. Disability index score of 0% for the DASH to assist with reaching prior level of function. [] Progressing: [] Met: [] Not Met: [] Adjusted  2. Patient will demonstrate increased L shoulder abduction AROM to >150° to allow for proper joint functioning as indicated by patients Functional Deficits. [] Progressing: [] Met: [] Not Met: [] Adjusted  3. Patient will demonstrate an increase in L shoulder flexion Strength by a half grade or greater to allow for proper functional mobility as indicated by patients Functional Deficits. [] Progressing: [] Met: [] Not Met: [] Adjusted  4. Patient will return to all functional activities without increased symptoms or restriction. [] Progressing: [] Met: [] Not Met: [] Adjusted  5. Patient will be able to reach [place] in abduction with L UE without pain so that he may return to PLOF for all ADLs.   [] Progressing: [] Met: [] Not

## 2020-01-20 ENCOUNTER — OFFICE VISIT (OUTPATIENT)
Dept: ORTHOPEDIC SURGERY | Age: 75
End: 2020-01-20
Payer: MEDICARE

## 2020-01-20 VITALS
HEART RATE: 54 BPM | DIASTOLIC BLOOD PRESSURE: 60 MMHG | HEIGHT: 69 IN | WEIGHT: 173.06 LBS | SYSTOLIC BLOOD PRESSURE: 118 MMHG | BODY MASS INDEX: 25.63 KG/M2

## 2020-01-20 PROBLEM — G20.A1 PARKINSON'S DISEASE: Status: ACTIVE | Noted: 2019-01-29

## 2020-01-20 PROBLEM — J05.10 EPIGLOTTITIS: Status: ACTIVE | Noted: 2019-01-12

## 2020-01-20 PROBLEM — T78.3XXA ANGIOEDEMA: Status: ACTIVE | Noted: 2019-01-12

## 2020-01-20 PROBLEM — R22.0 MOUTH SWELLING: Status: ACTIVE | Noted: 2019-01-12

## 2020-01-20 PROBLEM — R49.0 HOARSENESS OF VOICE: Status: ACTIVE | Noted: 2019-01-12

## 2020-01-20 PROBLEM — G62.9 PERIPHERAL POLYNEUROPATHY: Status: ACTIVE | Noted: 2019-01-29

## 2020-01-20 PROBLEM — Z86.718 H/O DEEP VENOUS THROMBOSIS: Status: ACTIVE | Noted: 2019-01-12

## 2020-01-20 PROBLEM — M19.90 OSTEOARTHROSIS: Status: ACTIVE | Noted: 2019-01-29

## 2020-01-20 PROBLEM — G20 PARKINSON'S DISEASE (HCC): Status: ACTIVE | Noted: 2019-01-29

## 2020-01-20 PROCEDURE — 4040F PNEUMOC VAC/ADMIN/RCVD: CPT | Performed by: PHYSICIAN ASSISTANT

## 2020-01-20 PROCEDURE — 1123F ACP DISCUSS/DSCN MKR DOCD: CPT | Performed by: PHYSICIAN ASSISTANT

## 2020-01-20 PROCEDURE — G8427 DOCREV CUR MEDS BY ELIG CLIN: HCPCS | Performed by: PHYSICIAN ASSISTANT

## 2020-01-20 PROCEDURE — 99212 OFFICE O/P EST SF 10 MIN: CPT | Performed by: PHYSICIAN ASSISTANT

## 2020-01-20 PROCEDURE — G8484 FLU IMMUNIZE NO ADMIN: HCPCS | Performed by: PHYSICIAN ASSISTANT

## 2020-01-20 PROCEDURE — 4004F PT TOBACCO SCREEN RCVD TLK: CPT | Performed by: PHYSICIAN ASSISTANT

## 2020-01-20 PROCEDURE — 3017F COLORECTAL CA SCREEN DOC REV: CPT | Performed by: PHYSICIAN ASSISTANT

## 2020-01-20 PROCEDURE — G8417 CALC BMI ABV UP PARAM F/U: HCPCS | Performed by: PHYSICIAN ASSISTANT

## 2020-01-20 RX ORDER — EPINEPHRINE 0.3 MG/.3ML
0.3 INJECTION SUBCUTANEOUS
COMMUNITY
Start: 2019-01-28

## 2020-01-20 RX ORDER — ESOMEPRAZOLE MAGNESIUM 40 MG/1
CAPSULE, DELAYED RELEASE ORAL
COMMUNITY
Start: 2019-12-04

## 2020-01-20 RX ORDER — LEVOTHYROXINE SODIUM 0.03 MG/1
TABLET ORAL
COMMUNITY
Start: 2019-12-04

## 2020-01-20 RX ORDER — BACLOFEN 20 MG/1
TABLET ORAL
COMMUNITY
Start: 2019-11-03

## 2020-01-20 NOTE — PROGRESS NOTES
No atrophy appreciated. Palpation:  No subacromial crepitus. No tenderness of the AC joint. No greater tuberosity tenderness. No tenderness in the bicipital groove. Range of Motion: Full passive and active ROM. Normal scapulothoracic rhythm. Strength:  Normal supraspinatus, infraspinatus, and subscapularis muscle strength. Stability: No anterior instability. No posterior instability. Special Tests: Impingement findings are mildly positive. Other findings: The skin is warm dry and well perfused. Sensation is intact to light touch over the deltoid. RIGHT comparison shoulder exam    Inspection:  Held in a normal posture. Normal contour at the acromioclavicular joint. No swelling, ecchymosis, or erythema about the shoulder. No atrophy appreciated. Palpation:  No subacromial crepitus. No tenderness of the AC joint. No greater tuberosity tenderness. No tenderness in the bicipital groove. Range of Motion: Full passive and active ROM. Normal scapulothoracic rhythm. Strength:  Normal supraspinatus, infraspinatus, and subscapularis muscle strength. Stability: No anterior instability. No posterior instability. Special Tests: Impingement findings are negative. Other findings: The skin is warm dry and well perfused. Sensation is intact to light touch over the deltoid. Radiology:       Pertinent imaging reviewed, images only - no report available. Assessment :  69 y.o. male with LEFT shoulder rotator cuff tendinosis - trending in a good direction. Impression:  Encounter Diagnoses   Name Primary?  Left shoulder pain, unspecified chronicity Yes    Tendinosis of right shoulder     Subdeltoid bursitis of left shoulder joint     Osteoarthritis of left knee, unspecified osteoarthritis type        Office Procedures:  No orders of the defined types were placed in this encounter. Plan: Continue physical therapy and home exercises. Followup as needed.  Lin Carty is in agreement with this plan. All questions were answered to patient's satisfaction and was encouraged to call with any further questions. [facility], PA-C      This dictation was performed with a verbal recognition program (DRAGON) and it was checked for errors. It is possible that there are still dictated errors within this office note. If so, please bring any errors to my attention for an addendum. All efforts were made to ensure that this office note is accurate.

## 2020-08-31 ENCOUNTER — OFFICE VISIT (OUTPATIENT)
Dept: ORTHOPEDIC SURGERY | Age: 75
End: 2020-08-31
Payer: MEDICARE

## 2020-08-31 VITALS — BODY MASS INDEX: 25.62 KG/M2 | WEIGHT: 173 LBS | HEIGHT: 69 IN

## 2020-08-31 PROCEDURE — G8427 DOCREV CUR MEDS BY ELIG CLIN: HCPCS | Performed by: ORTHOPAEDIC SURGERY

## 2020-08-31 PROCEDURE — 99213 OFFICE O/P EST LOW 20 MIN: CPT | Performed by: ORTHOPAEDIC SURGERY

## 2020-08-31 PROCEDURE — 4040F PNEUMOC VAC/ADMIN/RCVD: CPT | Performed by: ORTHOPAEDIC SURGERY

## 2020-08-31 PROCEDURE — 3017F COLORECTAL CA SCREEN DOC REV: CPT | Performed by: ORTHOPAEDIC SURGERY

## 2020-08-31 PROCEDURE — 4004F PT TOBACCO SCREEN RCVD TLK: CPT | Performed by: ORTHOPAEDIC SURGERY

## 2020-08-31 PROCEDURE — 1123F ACP DISCUSS/DSCN MKR DOCD: CPT | Performed by: ORTHOPAEDIC SURGERY

## 2020-08-31 PROCEDURE — 20610 DRAIN/INJ JOINT/BURSA W/O US: CPT | Performed by: ORTHOPAEDIC SURGERY

## 2020-08-31 PROCEDURE — G8417 CALC BMI ABV UP PARAM F/U: HCPCS | Performed by: ORTHOPAEDIC SURGERY

## 2020-08-31 RX ORDER — NITROGLYCERIN 0.4 MG/1
0.4 TABLET SUBLINGUAL EVERY 5 MIN PRN
COMMUNITY
Start: 2020-04-29

## 2020-08-31 RX ORDER — ROSUVASTATIN CALCIUM 40 MG/1
40 TABLET, COATED ORAL DAILY
COMMUNITY
Start: 2020-06-29

## 2020-08-31 RX ORDER — METHYLPREDNISOLONE ACETATE 40 MG/ML
40 INJECTION, SUSPENSION INTRA-ARTICULAR; INTRALESIONAL; INTRAMUSCULAR; SOFT TISSUE ONCE
Status: COMPLETED | OUTPATIENT
Start: 2020-08-31 | End: 2020-08-31

## 2020-08-31 RX ORDER — FUROSEMIDE 40 MG/1
40 TABLET ORAL 2 TIMES DAILY
COMMUNITY
Start: 2020-07-03

## 2020-08-31 RX ORDER — CLOPIDOGREL 300 MG/1
75 TABLET, FILM COATED ORAL ONCE
COMMUNITY

## 2020-08-31 RX ADMIN — METHYLPREDNISOLONE ACETATE 40 MG: 40 INJECTION, SUSPENSION INTRA-ARTICULAR; INTRALESIONAL; INTRAMUSCULAR; SOFT TISSUE at 16:01

## 2020-08-31 NOTE — PROGRESS NOTES
8/31/20 2:28 PM     Lidocaine Injection      NDC: 1175-9673-19    Lot Number: [id number]    Body Part: Left knee

## 2020-08-31 NOTE — PROGRESS NOTES
Chief Complaint  Follow-up (Left Knee)      History of Present Illness:  Meli Angel is a pleasant 69 y.o. male here today for follow-up regarding his left knee. The patient received a steroid injection back in October 2019 and he noted continued relief up until about 3 weeks ago. He denies any recent injury. He is here today to discuss another injection into his left knee. Of note the patient does not take any anti-inflammatory medications, especially because back in May of this year he had a heart stent placed and is on blood thinners. He denies any instability with his knee. He denies locking or mechanical symptoms. Pain Assessment  Location of Pain: Knee  Location Modifiers: Left  Severity of Pain: 4  Quality of Pain: Sharp, Dull, Aching  Duration of Pain: Persistent  Frequency of Pain: Intermittent  Aggravating Factors: Walking, Standing  Limiting Behavior: Yes  Relieving Factors: Rest  Work-Related Injury: No  Are there other pain locations you wish to document?: No    Medical History:  Patient's medications, allergies, past medical, surgical, social and family histories were reviewed and updated as appropriate. Pertinent items are noted in HPI  Review of systems reviewed from Patient History Form dated on 8/31/20 and available in the patient's chart under the Media tab. Vital Signs:  Vitals:         Neuro: Alert & oriented x 3,  normal,  no focal deficits noted. Normal affect. Eyes: sclera clear  Ears: Normal external ear  Mouth:  No perioral lesions  Pulm: Respirations unlabored and regular  Pulse: Regular rate and rhythm   Skin: Warm, well perfused      Constitutional: In no apparent distress. Normal affect. Alert and oriented X3 and is cooperative. Left knee exam    Gait: No use of assistive devices. No antalgic gait. Alignment: normal alignment. Inspection/skin: Skin is intact without erythema or ecchymosis. No gross deformity.      Palpation: Mild patellofemoral crepitation. Mild tenderness along the medial joint line. Range of Motion: There is full range of motion. Strength: Normal quadriceps development. Effusion: No effusion or swelling present. Ligamentous stability: No cruciate or collateral ligament instability. Neurologic and vascular: Skin is warm and well-perfused. Sensation is intact to light-touch. Special tests: Negative Julita sign. Right knee exam    Gait: No use of assistive devices. No antalgic gait. Alignment: normal alignment. Inspection/skin: Skin is intact without erythema or ecchymosis. No gross deformity. Palpation: no crepitus. no joint line tenderness present. Range of Motion: There is full range of motion. Strength: Normal quadriceps development. Effusion: No effusion or swelling present. Ligamentous stability: No cruciate or collateral ligament instability. Neurologic and vascular: Skin is warm and well-perfused. Sensation is intact to light-touch. Special tests: Negative Julita sign. Radiology:     No new x-rays taken today. Prior imaging reviewed. Assessment : Left knee osteoarthritis    Impression:  Encounter Diagnosis   Name Primary?  Osteoarthritis of left knee, unspecified osteoarthritis type Yes       Office Procedures:  No orders of the defined types were placed in this encounter. Plan: Pertinent imaging was reviewed. The etiology, natural history, and treatment options for the disorder were discussed. The roles of activity medication, antiinflammatories, injections, bracing, physical therapy, and surgical interventions were all described to the patient and questions were answered. Left knee injected with a steroid shot today. Avoid anti-inflammatory medications due to his recent heart history. Continue with therapy for his cardiac recovery and for work on his knee. Follow-up with us as needed. Ana Brambila is in agreement with this plan. accurate.

## 2020-09-02 ENCOUNTER — TELEPHONE (OUTPATIENT)
Dept: ORTHOPEDIC SURGERY | Age: 75
End: 2020-09-02

## 2020-09-09 ENCOUNTER — TELEPHONE (OUTPATIENT)
Dept: ORTHOPEDIC SURGERY | Age: 75
End: 2020-09-09

## 2020-09-09 DIAGNOSIS — M17.12 OSTEOARTHRITIS OF LEFT KNEE, UNSPECIFIED OSTEOARTHRITIS TYPE: Primary | ICD-10-CM

## 2020-09-10 ENCOUNTER — TELEPHONE (OUTPATIENT)
Dept: ORTHOPEDIC SURGERY | Age: 75
End: 2020-09-10

## 2020-09-10 NOTE — TELEPHONE ENCOUNTER
09/09/2020  [address] [place] [address]   LEFT  KNEE. NO AUTHORIZATION REQUIRED. VALID & BILLABLE. CAN BUY& BILL. PER RUPA @ [address], CALL REF # [id number].  AP

## 2020-09-14 ENCOUNTER — OFFICE VISIT (OUTPATIENT)
Dept: ORTHOPEDIC SURGERY | Age: 75
End: 2020-09-14
Payer: MEDICARE

## 2020-09-14 VITALS — WEIGHT: 173 LBS | TEMPERATURE: 98.2 F | HEIGHT: 69 IN | BODY MASS INDEX: 25.62 KG/M2

## 2020-09-14 PROCEDURE — 1123F ACP DISCUSS/DSCN MKR DOCD: CPT | Performed by: ORTHOPAEDIC SURGERY

## 2020-09-14 PROCEDURE — 4004F PT TOBACCO SCREEN RCVD TLK: CPT | Performed by: ORTHOPAEDIC SURGERY

## 2020-09-14 PROCEDURE — G8427 DOCREV CUR MEDS BY ELIG CLIN: HCPCS | Performed by: ORTHOPAEDIC SURGERY

## 2020-09-14 PROCEDURE — 99213 OFFICE O/P EST LOW 20 MIN: CPT | Performed by: ORTHOPAEDIC SURGERY

## 2020-09-14 PROCEDURE — 3017F COLORECTAL CA SCREEN DOC REV: CPT | Performed by: ORTHOPAEDIC SURGERY

## 2020-09-14 PROCEDURE — G8417 CALC BMI ABV UP PARAM F/U: HCPCS | Performed by: ORTHOPAEDIC SURGERY

## 2020-09-14 PROCEDURE — 4040F PNEUMOC VAC/ADMIN/RCVD: CPT | Performed by: ORTHOPAEDIC SURGERY

## 2020-09-14 PROCEDURE — 20610 DRAIN/INJ JOINT/BURSA W/O US: CPT | Performed by: ORTHOPAEDIC SURGERY

## 2020-09-14 NOTE — PROGRESS NOTES
Chief Complaint  Follow-up (left knee. monovisc injection )      History of Present Illness:  Karen Arellano is a pleasant 69 y.o. male who presents today for follow up evaluation of left knee pain. He has known osteoarthritis. He was given an intraarticular cortisone injection on 8/31/2020 which did not provide much relief. He is unable to take anti-inflammatories due to recent heart history. He continues to complain of pain exacerbated with prolonged sitting and walking. Denies locking or catching. No new injuries reported. Medical History:  Patient's medications, allergies, past medical, surgical, social and family histories were reviewed and updated as appropriate. Pertinent items are noted in HPI  Review of systems reviewed from Patient History Form completed today and available in the patient's chart under the Media tab. Pain Assessment  Location of Pain: Knee  Location Modifiers: Left  Severity of Pain: 4  Quality of Pain: Aching  Duration of Pain: Persistent  Frequency of Pain: Constant  Aggravating Factors: Other (Comment)(sitting)  Limiting Behavior: Some  Relieving Factors:  Ice  Result of Injury: No  Work-Related Injury: No  Are there other pain locations you wish to document?: No    Past Medical History:   Diagnosis Date    Arthritis     H/O blood clots     Neuropathy     Parkinson disease ([facility]     Seizure ([facility] [facility]     Thyroid disease         Past Surgical History:   Procedure Laterality Date    APPENDECTOMY      NOSE SURGERY      WRIST SURGERY         Family History   Problem Relation Age of Onset    Parkinsonism Sister     Diabetes Sister     Heart Disease Brother        Social History     Socioeconomic History    Marital status:      Spouse name: None    Number of children: None    Years of education: None    Highest education level: None   Occupational History    None   Social Needs    Financial resource strain: None    Food insecurity     Worry: None Inability: None    Transportation needs     Medical: None     Non-medical: None   Tobacco Use    Smoking status: Current Some Day Smoker     Types: Cigars    Smokeless tobacco: Never Used   Substance and Sexual Activity    Alcohol use:  Yes     Alcohol/week: 0.0 standard drinks    Drug use: None    Sexual activity: None   Lifestyle    Physical activity     Days per week: None     Minutes per session: None    Stress: None   Relationships    Social connections     Talks on phone: None     Gets together: None     Attends Synagogue service: None     Active member of club or organization: None     Attends meetings of clubs or organizations: None     Relationship status: None    Intimate partner violence     Fear of current or ex partner: None     Emotionally abused: None     Physically abused: None     Forced sexual activity: None   Other Topics Concern    None   Social History Narrative    None       Current Outpatient Medications   Medication Sig Dispense Refill    furosemide (LASIX) 40 MG tablet Take 40 mg by mouth 2 times daily      metoprolol tartrate (LOPRESSOR) 25 MG tablet TAKE 1/2 TABLET BY MOUTH TWICE A DAY      nitroGLYCERIN (NITROSTAT) 0.4 MG SL tablet Place 0.4 mg under the tongue every 5 minutes as needed      rosuvastatin (CRESTOR) 40 MG tablet Take 40 mg by mouth daily      clopidogrel (PLAVIX) 300 MG TABS Take 75 mg by mouth once      baclofen (LIORESAL) 20 MG tablet       esomeprazole (NEXIUM) 40 MG delayed release capsule       levothyroxine (SYNTHROID) 25 MCG tablet       EPINEPHrine (EPIPEN) 0.3 MG/0.3ML SOAJ injection Inject 0.3 mg into the muscle      rivaroxaban (XARELTO) 20 MG TABS tablet TAKE 1 TABLET EVERY DAY      rasagiline mesylate (AZILECT) 1 MG TABS Take 1 tablet by mouth daily      Mometasone Furoate (NASONEX NA) by Nasal route      Multiple Vitamins-Minerals (MULTIVITAMIN PO) Take by mouth      Misc Natural Products (OSTEO BI-FLEX JOINT SHIELD PO) Take by mouth  phenytoin (DILANTIN) 100 MG ER capsule Take by mouth 2 times daily       No current facility-administered medications for this visit. Allergies   Allergen Reactions    Demerol Hcl [Meperidine]      hallucination         Vital signs:  Temp 98.2 °F (36.8 °C)   Ht 5' 9\" (1.753 m)   Wt 173 lb (78.5 kg)   BMI 25.55 kg/m²      Constitution: Patient cooperative with examination today. Well-developed, well-nourished in no acute distress. Neuro: Alert & oriented x 3,  no focal motor or sensory deficits noted. Eyes: sclera clear, atraumatic  Ears: Normal external ear  Mouth:  No perioral lesions  Pulm: Respirations unlabored and regular  Pulse: Extremities well-perfused. 2+ peripheral pulses   Skin: Warm, no ulcerations        LEFT Knee Examination:    Gait: No use of assistive devices. No antalgic gait. Alignment: Alignment appreciated. Inspection/skin: Quadriceps well developed. Skin is intact without erythema or ecchymosis. No gross deformity. Palpation: Crepitus present. Nontender along joint line. No pain with compression of patella. Nontender to light touch. Range of Motion: Full ROM. Strength: 5/5 quad strength    Effusion: No apparent effusion. Ligamentous stability: Stable to valgus and varus stress at 0° and 30°. Solid endpoint with Lachman's. Negative posterior and anterior drawer signs. Patella tracking: Smooth translation of patella. Special tests: Negative Julita sign. Patella apprehension sign negative. Neurologic and vascular: Skin is warm and well-perfused. Distally neurovascularly intact. Additional findings: Calf soft nontender. Sensation is intact to light-touch. No pretibial edema. RIGHT Knee Exam:    Gait: No use of assistive devices. No antalgic gait. Alignment: normal alignment. Inspection/skin: Skin is intact without erythema or ecchymosis. No gross deformity. Palpation: no crepitus. no joint line tenderness present.     Range

## 2020-09-14 NOTE — PROGRESS NOTES
9/14/20 5:19 PM     Lidocaine Injection      NDC: [address]-62    Lot Number: [id number]    Body Part: left knee

## 2021-01-19 ENCOUNTER — OFFICE VISIT (OUTPATIENT)
Dept: ORTHOPEDIC SURGERY | Age: 76
End: 2021-01-19
Payer: MEDICARE

## 2021-01-19 VITALS — BODY MASS INDEX: 22.36 KG/M2 | TEMPERATURE: 96.7 F | WEIGHT: 151 LBS | HEIGHT: 69 IN

## 2021-01-19 DIAGNOSIS — M25.522 LEFT ELBOW PAIN: ICD-10-CM

## 2021-01-19 DIAGNOSIS — M70.22 OLECRANON BURSITIS OF LEFT ELBOW: Primary | ICD-10-CM

## 2021-01-19 PROCEDURE — G8420 CALC BMI NORM PARAMETERS: HCPCS | Performed by: ORTHOPAEDIC SURGERY

## 2021-01-19 PROCEDURE — G8484 FLU IMMUNIZE NO ADMIN: HCPCS | Performed by: ORTHOPAEDIC SURGERY

## 2021-01-19 PROCEDURE — 4004F PT TOBACCO SCREEN RCVD TLK: CPT | Performed by: ORTHOPAEDIC SURGERY

## 2021-01-19 PROCEDURE — 99214 OFFICE O/P EST MOD 30 MIN: CPT | Performed by: ORTHOPAEDIC SURGERY

## 2021-01-19 PROCEDURE — 4040F PNEUMOC VAC/ADMIN/RCVD: CPT | Performed by: ORTHOPAEDIC SURGERY

## 2021-01-19 PROCEDURE — 1123F ACP DISCUSS/DSCN MKR DOCD: CPT | Performed by: ORTHOPAEDIC SURGERY

## 2021-01-19 PROCEDURE — 3017F COLORECTAL CA SCREEN DOC REV: CPT | Performed by: ORTHOPAEDIC SURGERY

## 2021-01-19 PROCEDURE — G8427 DOCREV CUR MEDS BY ELIG CLIN: HCPCS | Performed by: ORTHOPAEDIC SURGERY

## 2021-11-15 ENCOUNTER — CLINICAL DOCUMENTATION (OUTPATIENT)
Dept: OTHER | Age: 76
End: 2021-11-15

## 2021-11-29 ENCOUNTER — TELEPHONE (OUTPATIENT)
Dept: ORTHOPEDIC SURGERY | Age: 76
End: 2021-11-29

## 2022-02-01 NOTE — PROGRESS NOTES
Date:  2021    Name:  Sheree Samano  Address:  [address] [place] [address]  [place] [address]    :  1945      Age:   77 y.o.    SSN:  [id number]      Medical Record Number:  <[id number]>    Reason for Visit:    Chief Complaint    Elbow Pain (old patient / new problem left elbow. swelling )      DOS:2021     HPI: Sheree Samano is a 77 y.o. male here today for evaluation of left elbow pain and swelling that has been ongoing for a month. He does not recall an associated injury. He complains of lateral pain and swelling over elbow. Pain is exacerbated with pressure or bumping the elbow. Denies numbness and tingling. He has been prescribed a medrol dose pack and Keflex with no improvement of swelling. Pain Assessment  Location of Pain: Elbow  Location Modifiers: Left  Severity of Pain: 2  Quality of Pain: Dull  Duration of Pain: A few minutes  Frequency of Pain: Intermittent  Aggravating Factors: (applying pressure)  Limiting Behavior: Some  Relieving Factors: Rest  Result of Injury: No  Work-Related Injury: No  Are there other pain locations you wish to document?: No  ROS: Review of systems reviewed from Patient History Form completed today and available in the patient's chart under the Media tab.      Past Medical History:   Diagnosis Date    Arthritis     H/O blood clots     Neuropathy     Parkinson disease (HCC)     Seizure ([facility] [address]     Thyroid disease         Past Surgical History:   Procedure Laterality Date    APPENDECTOMY      NOSE SURGERY      WRIST SURGERY         Family History   Problem Relation Age of Onset    Parkinsonism Sister     Diabetes Sister     Heart Disease Brother        Social History     Socioeconomic History    Marital status:      Spouse name: None    Number of children: None    Years of education: None    Highest education level: None   Occupational History    None   Social Needs    Financial resource strain: None    Food insecurity     Worry: None Inability: None    Transportation needs     Medical: None     Non-medical: None   Tobacco Use    Smoking status: Current Some Day Smoker     Types: Cigars    Smokeless tobacco: Never Used   Substance and Sexual Activity    Alcohol use:  Yes     Alcohol/week: 0.0 standard drinks    Drug use: None    Sexual activity: None   Lifestyle    Physical activity     Days per week: None     Minutes per session: None    Stress: None   Relationships    Social connections     Talks on phone: None     Gets together: None     Attends Zoroastrianism service: None     Active member of club or organization: None     Attends meetings of clubs or organizations: None     Relationship status: None    Intimate partner violence     Fear of current or ex partner: None     Emotionally abused: None     Physically abused: None     Forced sexual activity: None   Other Topics Concern    None   Social History Narrative    None       Current Outpatient Medications   Medication Sig Dispense Refill    furosemide (LASIX) 40 MG tablet Take 40 mg by mouth 2 times daily      metoprolol tartrate (LOPRESSOR) 25 MG tablet TAKE 1/2 TABLET BY MOUTH TWICE A DAY      nitroGLYCERIN (NITROSTAT) 0.4 MG SL tablet Place 0.4 mg under the tongue every 5 minutes as needed      rosuvastatin (CRESTOR) 40 MG tablet Take 40 mg by mouth daily      clopidogrel (PLAVIX) 300 MG TABS Take 75 mg by mouth once      baclofen (LIORESAL) 20 MG tablet       esomeprazole (NEXIUM) 40 MG delayed release capsule       levothyroxine (SYNTHROID) 25 MCG tablet       EPINEPHrine (EPIPEN) 0.3 MG/0.3ML SOAJ injection Inject 0.3 mg into the muscle      rivaroxaban (XARELTO) 20 MG TABS tablet TAKE 1 TABLET EVERY DAY      rasagiline mesylate (AZILECT) 1 MG TABS Take 1 tablet by mouth daily      Mometasone Furoate (NASONEX NA) by Nasal route      Multiple Vitamins-Minerals (MULTIVITAMIN PO) Take by mouth      Misc Natural Products (OSTEO BI-FLEX JOINT SHIELD PO) Take by mouth discussed. The roles of activity medication, antiinflammatories, injections, bracing, physical therapy, and surgical interventions were all described to the patient and questions were answered. Treatment is based on how much it bothers him. We discussed his treatment options. He states it is only painful when he bumps it or with pressure. I recommend he use an elbow pad for protection. If no improvement we can proceed with aspiration. I will see him back if symptoms persist or worsen. Lizette Kamara is in agreement with this plan. All questions were answered to patient's satisfaction and was encouraged to call with any further questions. Orders Placed This Encounter   Procedures    XR ELBOW LEFT (MIN 3 VIEWS)     Standing Status:   Future     Number of Occurrences:   1     Standing Expiration Date:   1/18/2022     Order Specific Question:   Reason for exam:     Answer:   elbow pain             I, Na Wilhelm ATC, am scribing for and in the presence of Dr. Alicia Moya. 01/19/21 7:58 PM Na Wilhelm ATC      I attest that I met personally with the patient, performed the described exam, reviewed the radiographic studies and medical records associated with this patient and supervised the services that are described above.      Isaac Hull MD [facility] Counseling: I discussed with the patient the risks of tildrakizumab including but not limited to immunosuppression, malignancy, posterior leukoencephalopathy syndrome, and serious infections.  The patient understands that monitoring is required including a PPD at baseline and must alert us or the primary physician if symptoms of infection or other concerning signs are noted.

## 2025-05-01 ENCOUNTER — OFFICE VISIT (OUTPATIENT)
Dept: ORTHOPEDIC SURGERY | Age: 80
End: 2025-05-01

## 2025-05-01 VITALS — WEIGHT: 151 LBS | BODY MASS INDEX: 22.36 KG/M2 | HEIGHT: 69 IN

## 2025-05-01 DIAGNOSIS — Z98.890 HISTORY OF CARPAL TUNNEL RELEASE: ICD-10-CM

## 2025-05-01 DIAGNOSIS — M79.642 LEFT HAND PAIN: Primary | ICD-10-CM

## 2025-05-01 DIAGNOSIS — G56.02 CARPAL TUNNEL SYNDROME OF LEFT WRIST: ICD-10-CM

## 2025-05-01 RX ORDER — METHYLPREDNISOLONE 4 MG/1
TABLET ORAL
Qty: 1 KIT | Refills: 0 | Status: SHIPPED | OUTPATIENT
Start: 2025-05-01

## 2025-05-01 NOTE — PROGRESS NOTES
Chief Complaint:   Chief Complaint   Patient presents with    Hand Pain     Left Hand - Pain started a month ago. Mainly at night. Wakes him up. Burning in palm, thumb, and 2 first fingers.          History of Present Illness:       Patient is a 79 y.o. male who presents with the following complaint.The symptoms began 1 monthsago and started without an injury.The patient describes a burning pain in the palm that does not radiate.  The symptoms are intermittent  and are cycling since the onset typically noticeable as a nocturnal event    Past history significant for carpal tunnel release right-5/16/2020    Pain localizes palmar area and is not associated with mechanical symptoms.  The patient denies subjective instability about the wrist and denies new onset weakness of the upper extremity.    Pain level 4    The patient denies a pattern of activity related swelling.      Treatment to date: nothing specific    There is nono prior history of wrist trauma. Workup has included: None .    There is no prior history of autoimmune disease, inflammatory arthropathy, or crystal arthropathy.       Past Medical History:        Past Medical History:   Diagnosis Date    Arthritis     H/O blood clots     Neuropathy     Parkinson disease (HCC)     Seizure ([facility])     Thyroid disease         Present Medications:         Current Outpatient Medications   Medication Sig Dispense Refill    methylPREDNISolone (MEDROL, HUGH,) 4 MG tablet By mouth. 1 kit 0    furosemide (LASIX) 40 MG tablet Take 40 mg by mouth 2 times daily      metoprolol tartrate (LOPRESSOR) 25 MG tablet TAKE 1/2 TABLET BY MOUTH TWICE A DAY      nitroGLYCERIN (NITROSTAT) 0.4 MG SL tablet Place 0.4 mg under the tongue every 5 minutes as needed      rosuvastatin (CRESTOR) 40 MG tablet Take 40 mg by mouth daily      clopidogrel (PLAVIX) 300 MG TABS Take 75 mg by mouth once      baclofen (LIORESAL) 20 MG tablet       esomeprazole (NEXIUM) 40 MG delayed release capsule